# Patient Record
(demographics unavailable — no encounter records)

---

## 2025-02-06 NOTE — DISCUSSION/SUMMARY
[EKG obtained to assist in diagnosis and management of assessed problem(s)] : EKG obtained to assist in diagnosis and management of assessed problem(s) [FreeTextEntry1] : Pt is a 44 y/o M with PMH HLD, BMI 28   HLD: he was not taking statin for last blood work - lipids very elevated he has restarted repeat labs Advised lifestyle modifications   Most recent available lab results were reviewed with pt. Pt will return in 12 mnths or sooner as needed but I encouraged communication via phone or portal if necessary.  We will call pt with test results when applicable and arrange for expedited follow up if warranted.  The described plan was discussed with the pt.  All questions and concerns were addressed to the best of my knowledge.

## 2025-02-06 NOTE — HISTORY OF PRESENT ILLNESS
[FreeTextEntry1] : Pt is a 44 y/o M PMH HLD, BMI 28.   Pt reports feeling well and has no active cardiac complaints - denies CP, SOB, palpitations, dizziness, syncope, edema, orthopnea, PND, orthopnea.  No exertional symptoms. No new hospitalizations, emergency room/urgent care visits, new medical diagnoses, new medications, surgery, or cardiac testing since last OV.   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